# Patient Record
Sex: FEMALE | Race: BLACK OR AFRICAN AMERICAN | NOT HISPANIC OR LATINO | Employment: FULL TIME | URBAN - METROPOLITAN AREA
[De-identification: names, ages, dates, MRNs, and addresses within clinical notes are randomized per-mention and may not be internally consistent; named-entity substitution may affect disease eponyms.]

---

## 2018-11-19 ENCOUNTER — HOSPITAL ENCOUNTER (EMERGENCY)
Facility: HOSPITAL | Age: 43
Discharge: HOME | End: 2018-11-19
Attending: EMERGENCY MEDICINE

## 2018-11-19 VITALS
HEART RATE: 69 BPM | DIASTOLIC BLOOD PRESSURE: 71 MMHG | OXYGEN SATURATION: 100 % | BODY MASS INDEX: 29.31 KG/M2 | HEIGHT: 65 IN | TEMPERATURE: 98 F | SYSTOLIC BLOOD PRESSURE: 119 MMHG | WEIGHT: 175.94 LBS | RESPIRATION RATE: 17 BRPM

## 2018-11-19 DIAGNOSIS — M79.645 PAIN OF FINGER OF LEFT HAND: ICD-10-CM
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
## 2018-11-19 PROCEDURE — 25000003 PHARM REV CODE 250: Performed by: NURSE PRACTITIONER

## 2018-11-19 PROCEDURE — 99283 EMERGENCY DEPT VISIT LOW MDM: CPT

## 2018-11-19 RX ORDER — HYDROCODONE BITARTRATE AND ACETAMINOPHEN 10; 325 MG/1; MG/1
1 TABLET ORAL
Status: COMPLETED | OUTPATIENT
Start: 2018-11-19 | End: 2018-11-19

## 2018-11-19 RX ADMIN — HYDROCODONE BITARTRATE AND ACETAMINOPHEN 1 TABLET: 10; 325 TABLET ORAL at 01:11

## 2018-11-19 NOTE — ED PROVIDER NOTES
"     HISTORY     Chief Complaint   Patient presents with    Hand Pain     pt reports "I fell alseep with my ring on and now its stuck.. I tried to cut it off but I think I made it worse"     Review of patient's allergies indicates:  No Known Allergies     HPI   43 year old female with a complaint of right ring finger swelling. Patient has a ring that she forgot to take off last night and her finger is swollen now and she can not get it off. Patient complains of pain and swelling. Worsens with movement and palpation.       The history is provided by the patient.        PCP: No primary care provider on file.     Past Medical History:  Past Medical History:   Diagnosis Date    Hypertension         Past Surgical History:  Past Surgical History:   Procedure Laterality Date    uterine ablation          Family History:  No family history on file.     Social History:  Social History     Tobacco Use    Smoking status: Never Smoker   Substance and Sexual Activity    Alcohol use: No    Drug use: No    Sexual activity: Not on file         ROS   Review of Systems   Constitutional: Negative for fever.   HENT: Negative for sore throat.    Respiratory: Negative for shortness of breath.    Cardiovascular: Negative for chest pain.   Gastrointestinal: Negative for nausea.   Genitourinary: Negative for dysuria.   Musculoskeletal: Negative for back pain.   Skin: Negative for rash.        Right ring finger swollen with ring stuck    Neurological: Negative for weakness.   Hematological: Does not bruise/bleed easily.       PHYSICAL EXAM     Initial Vitals [11/19/18 0101]   BP Pulse Resp Temp SpO2   119/71 69 17 98 °F (36.7 °C) 100 %      MAP       --           Physical Exam    Constitutional: She appears well-developed and well-nourished. No distress.   HENT:   Head: Normocephalic and atraumatic.   Eyes: Conjunctivae are normal. Pupils are equal, round, and reactive to light.   Neck: Normal range of motion. Neck supple. " "  Cardiovascular: Normal rate, regular rhythm and normal heart sounds.   Pulmonary/Chest: Breath sounds normal.   Abdominal: Soft. Bowel sounds are normal. She exhibits no distension. There is no tenderness. There is no rebound.   Musculoskeletal: Normal range of motion. She exhibits no edema.        Hands:  Neurological: She is alert and oriented to person, place, and time. She has normal strength.   Skin: Skin is warm and dry.   Psychiatric: She has a normal mood and affect.          ED COURSE   Foreign Body  Date/Time: 11/19/2018 1:17 AM  Performed by: Willy Garcia NP  Authorized by: Rodger Guardado MD   Body area: skin  General location: upper extremity  Location details: right ring finger  Patient restrained: no  Patient cooperative: yes  Localization method: visualized  Removal mechanism: wire snips   Tendon involvement: none  1 objects recovered.  Objects recovered: ring   Post-procedure assessment: foreign body removed  Patient tolerance: Patient tolerated the procedure well with no immediate complications      ED ONGOING VITALS:  Vitals:    11/19/18 0101   BP: 119/71   Pulse: 69   Resp: 17   Temp: 98 °F (36.7 °C)   TempSrc: Oral   SpO2: 100%   Weight: 79.8 kg (175 lb 14.8 oz)   Height: 5' 5" (1.651 m)         ABNORMAL LAB VALUES:  Labs Reviewed - No data to display      ALL LAB VALUES:        RADIOLOGY STUDIES:  Imaging Results    None                   The above vital signs and test results have been reviewed by the emergency provider.     ED Medications:  Medications   HYDROcodone-acetaminophen  mg per tablet 1 tablet (1 tablet Oral Given 11/19/18 0116)       Discharge Medications:  This SmartLink is deprecated. Use [facility] instead to display the medication list for a patient.   Follow-up Information     [facility]    Specialty:  Emergency Medicine  Why:  As needed, If symptoms worsen  Contact information:  [address]  [facility] " [id number]  [phone]                I discussed with patient and/or family/caretaker that evaluation in the ED does not suggest any emergent or life threatening medical conditions requiring immediate intervention beyond what was provided in the ED, and I believe patient is safe for discharge. Regardless, an unremarkable evaluation in the ED does not preclude the development or presence of a serious or life threatening condition. As such, patient was instructed to return immediately for any worsening or change in current symptoms.        MEDICAL DECISION MAKING                 CLINICAL IMPRESSION       ICD-10-CM ICD-9-CM   1. Ring or other jewelry causing external constriction, initial encounter W49.04XA E928.5   2. Pain of finger of left hand M79.645 729.5       Disposition:   Disposition: Discharged  Condition: Stable         Willy Garcia NP  11/19/18 0118